# Patient Record
Sex: FEMALE | Race: WHITE | HISPANIC OR LATINO | Employment: FULL TIME | URBAN - METROPOLITAN AREA
[De-identification: names, ages, dates, MRNs, and addresses within clinical notes are randomized per-mention and may not be internally consistent; named-entity substitution may affect disease eponyms.]

---

## 2024-02-17 PROBLEM — N63.15 BREAST LUMP ON RIGHT SIDE AT 9 O'CLOCK POSITION: Status: ACTIVE | Noted: 2024-02-17

## 2024-02-17 PROBLEM — N64.4 BREAST PAIN, RIGHT: Status: ACTIVE | Noted: 2024-02-17

## 2024-02-17 NOTE — PROGRESS NOTES
Date of Service: 2/28/2024    Chief Complaint:   Mahsa Farooq is a 33 y.o. female presenting today for right breast pain.  Her imaging has shown a mass in the right breast that is thought to be benign.  Short-term follow up was recommended.  She reports no interval changes on her self-breast examination.     History of Present Illness:   Mrs. Farooq presents on February 28, 2024 due to right breast pain.  She also has a right breast mass for which short-term follow up was recommended.   MD::Barbara Jimenez MD    Past Medical History:   Diagnosis Date    Breast lump on right side at 9 o'clock position 02/17/2024    Breast pain, right 02/17/2024    Fibroadenoma, right       Past Surgical History:   Procedure Laterality Date    BREAST BIOPSY      Benign path    NASAL SEPTOPLASTY  11/04/2022    TONSILLECTOMY      WISDOM TOOTH EXTRACTION        No current outpatient medications on file.   Review of patient's allergies indicates:  No Known Allergies   Social History     Tobacco Use    Smoking status: Never    Smokeless tobacco: Never   Substance Use Topics    Alcohol use: Yes     Alcohol/week: 2.0 standard drinks of alcohol     Types: 2 Glasses of wine per week     Comment: occasional      Family History   Problem Relation Age of Onset    Breast cancer Maternal Grandmother     Lung cancer Maternal Grandfather     Diabetes type II Maternal Grandfather     Mental illness Maternal Grandfather         Review of Systems   Integumentary:  Negative for color change, rash, mole/lesion, thickening/swelling, dimpling of skin, drainage  Breast: Negative for mass but Positive for tenderness     Physical Exam   Constitutional: She appears well-developed. She is cooperative.   HENT: Normocephalic.   Cardiovascular:  Normal rate and regular rhythm.            Pulmonary/Chest: She exhibits no tenderness and no bony tenderness.   Abdominal: Soft. Normal appearance.   Musculoskeletal: Intact with no deficits  Neurological:  She is alert.   Skin: No rash noted.   Breast and Chest Wall Evaluation:   Right breast exhibits no mass, no nipple discharge, no skin change and no tenderness.     Left breast exhibits no mass, no nipple discharge, no skin change and no tenderness.     Lymphadenopathy: No supraclavicular or axillary adenopathy.    ULTRASOUND REPORT from 12/29/2022: two solid masses in the right breast appear benign    ASSESSMENT and PLAN OF CARE     1. Breast lump on right side at 9 o'clock position  Assessment & Plan:  She has had masses in the right breast in the past.  The one at 6 o'clock has been biopsied and the one at 9 o'clock is thought to be benign.  She has not had follow up imaging as recommended, therefore, we will help her obtain her MGM and US today.  I will then make further recommendations as needed.      2. Breast pain, right  Assessment & Plan:  We discussed our Fibrocystic Mastopathy Protocol in detail. She should take Vitamin E 800 IU everyday x 3 months or until non-tender then can stop Vitamin E vs. continue daily at 400 IU.  The use of ice packs or warms soaks to tender area of the breast may also be of some benefit.  If warm soaks help her tenderness - She can use Aspercreme (unless allergic to Aspirin) on the affected area.  Ibuprofen (if no contraindications) at 800 mg three times per day for 5 days can also relieve many symptoms associated with swollen or inflamed tissue.  She can repeat Ibuprofen for 5 days, but then should be off for 5 days as it may cause gastric upset.  It is a good idea to wear a tight bra during the day and night to minimize movement of the tender area (Sports Bras work well).  Evening Primrose Oil can be bought over the counter and used at a dose of 3000 mg per day to help with any breast pain/tenderness not improved by implementing the above measures.        Medical Decision Making: It is my impression that this patient suffers all conditions contained in this medical document.   Each of these conditions did affect our plan of care and my medical decision making today.  It is my opinion that the medical decision making concerning this patient was of moderate difficulty based on the aforementioned conditions.  Any further recommendations will be communicated to the patient by me.  I have reviewed and verified her allergies, list of medications, medical and surgical histories, social history, and a pertinent review of symptoms.    Follow up:  1 year and prn if her imaging is stable    For:  Physical Examination and MGM (D) at [facility]

## 2024-02-17 NOTE — ASSESSMENT & PLAN NOTE
She has had masses in the right breast in the past.  The one at 6 o'clock has been biopsied and the one at 9 o'clock is thought to be benign.  She has not had follow up imaging as recommended, therefore, we will help her obtain her MGM and US today.  I will then make further recommendations as needed.

## 2024-02-28 ENCOUNTER — OFFICE VISIT (OUTPATIENT)
Dept: SURGERY | Facility: CLINIC | Age: 34
End: 2024-02-28

## 2024-02-28 DIAGNOSIS — N64.4 BREAST PAIN, RIGHT: ICD-10-CM

## 2024-02-28 DIAGNOSIS — N63.15 BREAST LUMP ON RIGHT SIDE AT 9 O'CLOCK POSITION: Primary | ICD-10-CM

## 2024-02-28 PROCEDURE — 99203 OFFICE O/P NEW LOW 30 MIN: CPT | Mod: S$PBB,,, | Performed by: SPECIALIST

## 2024-02-28 PROCEDURE — 99999 PR PBB SHADOW E&M-EST. PATIENT-LVL III: CPT | Mod: PBBFAC,,, | Performed by: SPECIALIST

## 2024-02-28 PROCEDURE — 99213 OFFICE O/P EST LOW 20 MIN: CPT | Mod: PBBFAC,PN | Performed by: SPECIALIST

## 2024-02-28 RX ORDER — AZELASTINE 1 MG/ML
1 SPRAY, METERED NASAL 2 TIMES DAILY
COMMUNITY
Start: 2024-02-20

## 2024-08-09 ENCOUNTER — PATIENT MESSAGE (OUTPATIENT)
Dept: SURGERY | Facility: CLINIC | Age: 34
End: 2024-08-09

## 2025-01-15 ENCOUNTER — TELEPHONE (OUTPATIENT)
Dept: PALLIATIVE MEDICINE | Facility: CLINIC | Age: 35
End: 2025-01-15

## 2025-01-15 NOTE — TELEPHONE ENCOUNTER
Attempted to contact patient to reschedule Dr. Person's appointment. No answer, left voicemail with call back information.

## 2025-02-07 ENCOUNTER — OFFICE VISIT (OUTPATIENT)
Dept: URGENT CARE | Facility: CLINIC | Age: 35
End: 2025-02-07
Payer: COMMERCIAL

## 2025-02-07 VITALS
TEMPERATURE: 98 F | WEIGHT: 151.13 LBS | OXYGEN SATURATION: 98 % | SYSTOLIC BLOOD PRESSURE: 109 MMHG | HEART RATE: 88 BPM | RESPIRATION RATE: 16 BRPM | HEIGHT: 62 IN | DIASTOLIC BLOOD PRESSURE: 61 MMHG | BODY MASS INDEX: 27.81 KG/M2

## 2025-02-07 DIAGNOSIS — R09.81 NASAL CONGESTION: Primary | ICD-10-CM

## 2025-02-07 PROCEDURE — 99203 OFFICE O/P NEW LOW 30 MIN: CPT | Mod: S$GLB,,, | Performed by: PHYSICIAN ASSISTANT
